# Patient Record
Sex: MALE | Race: BLACK OR AFRICAN AMERICAN | URBAN - METROPOLITAN AREA
[De-identification: names, ages, dates, MRNs, and addresses within clinical notes are randomized per-mention and may not be internally consistent; named-entity substitution may affect disease eponyms.]

---

## 2018-09-05 ENCOUNTER — TRANSFERRED RECORDS (OUTPATIENT)
Dept: HEALTH INFORMATION MANAGEMENT | Facility: CLINIC | Age: 58
End: 2018-09-05

## 2018-09-11 ENCOUNTER — PRE VISIT (OUTPATIENT)
Dept: OPHTHALMOLOGY | Facility: CLINIC | Age: 58
End: 2018-09-11

## 2018-09-11 PROBLEM — Z78.9 ENROLLED IN CHRONIC CARE MANAGEMENT: Status: ACTIVE | Noted: 2018-08-07

## 2018-09-11 PROBLEM — Z92.89 HISTORY OF PFTS: Status: ACTIVE | Noted: 2017-06-06

## 2018-09-11 PROBLEM — Z96.649 H/O TOTAL HIP ARTHROPLASTY: Status: ACTIVE | Noted: 2018-09-11

## 2018-09-11 PROBLEM — Z95.828 S/P IVC FILTER: Status: ACTIVE | Noted: 2018-09-11

## 2018-09-11 PROBLEM — E66.811 OBESITY (BMI 30.0-34.9): Status: ACTIVE | Noted: 2018-09-11

## 2018-09-11 PROBLEM — R74.8 ELEVATED LIVER ENZYMES: Status: ACTIVE | Noted: 2018-09-11

## 2018-09-11 RX ORDER — TAMSULOSIN HYDROCHLORIDE 0.4 MG/1
1 CAPSULE ORAL DAILY
COMMUNITY
Start: 2018-08-22

## 2018-09-11 NOTE — TELEPHONE ENCOUNTER
September 11, 2018               Pre-Visit Documentation: Tarik Chilel is a 57 year old male      Chief Complaint   Patient presents with     Previsit     appt w/ Dr Lewis     Tearing Left Eye     refered by Dr Jose         Current Outpatient Prescriptions   Medication Sig Dispense Refill     tamsulosin (FLOMAX) 0.4 MG capsule Take 1 capsule by mouth daily       AMBIEN 10 MG OR TABS 1 TABLET AT BEDTIME AS NEEDED       CELEBREX 200 MG OR CAPS 1 Capsule daily       COUMADIN 5 MG OR TABS 1 TABLET DAILY 30 12     IBUPROFEN 800 MG OR TABS 1 tab po TID (Three times per day) with food       NEURONTIN 300 MG OR CAPS 1 CAPSULE 3 TIMES DAILY       OTHER MEDICAL SUPPLIES add description of item-bilateral knee hi hoisery 1 1     OXYCODONE-ACETAMINOPHEN 5-325 MG OR TABS 1 TABLET EVERY 4 TO 6 HOURS AS NEEDED       OXYCODONE-ACETAMINOPHEN 5-325 MG/5ML OR SOLN 1 TEASPOONFUL EVERY 6 HOURS AS NEEDED       TEQUIN 400 MG OR TABS 1 TABLET DAILY         [facility]

## 2018-10-31 ENCOUNTER — OFFICE VISIT (OUTPATIENT)
Dept: OPHTHALMOLOGY | Facility: CLINIC | Age: 58
End: 2018-10-31
Payer: MEDICARE

## 2018-10-31 DIAGNOSIS — H04.222 EPIPHORA DUE TO INSUFFICIENT DRAINAGE OF LEFT SIDE: ICD-10-CM

## 2018-10-31 DIAGNOSIS — H02.135 SENILE ECTROPION OF LEFT LOWER EYELID: Primary | ICD-10-CM

## 2018-10-31 PROCEDURE — 86255 FLUORESCENT ANTIBODY SCREEN: CPT | Mod: 90 | Performed by: OPHTHALMOLOGY

## 2018-10-31 PROCEDURE — 92285 EXTERNAL OCULAR PHOTOGRAPHY: CPT | Performed by: OPHTHALMOLOGY

## 2018-10-31 PROCEDURE — 83519 RIA NONANTIBODY: CPT | Mod: 90 | Performed by: OPHTHALMOLOGY

## 2018-10-31 PROCEDURE — 83516 IMMUNOASSAY NONANTIBODY: CPT | Mod: 90 | Performed by: OPHTHALMOLOGY

## 2018-10-31 PROCEDURE — 99000 SPECIMEN HANDLING OFFICE-LAB: CPT | Performed by: OPHTHALMOLOGY

## 2018-10-31 PROCEDURE — 36415 COLL VENOUS BLD VENIPUNCTURE: CPT | Performed by: OPHTHALMOLOGY

## 2018-10-31 PROCEDURE — 99203 OFFICE O/P NEW LOW 30 MIN: CPT | Mod: 25 | Performed by: OPHTHALMOLOGY

## 2018-10-31 PROCEDURE — 68810 PROBE NASOLACRIMAL DUCT: CPT | Mod: LT | Performed by: OPHTHALMOLOGY

## 2018-10-31 ASSESSMENT — SLIT LAMP EXAM - LIDS: COMMENTS: NORMAL

## 2018-10-31 ASSESSMENT — VISUAL ACUITY
OS_CC: 20/40
METHOD: SNELLEN - LINEAR
OD_CC: 20/25

## 2018-10-31 NOTE — NURSING NOTE
Patient presents with:  Tearing Left Eye: refered by Dr Jose for tearing for a couple of months      Referring Provider:  Isra Jose MD  [facility] OPHTHALMOLOGY  [address] [address]        [facility]

## 2018-10-31 NOTE — MR AVS SNAPSHOT
After Visit Summary   10/31/2018    Tarik Chilel    MRN: [id number]           Patient Information     Date Of Birth          1960        Visit Information        Provider Department      10/31/2018 10:45 AM Roxanna Lewis MD [facility]        Today's Diagnoses     Senile ectropion of left lower eyelid    -  1    Epiphora due to insufficient drainage of left side           Follow-ups after your visit        Who to contact     If you have questions or need follow up information about today's clinic visit or your schedule please contact [facility] directly at [phone].  Normal or non-critical lab and imaging results will be communicated to you by MyChart, letter or phone within 4 business days after the clinic has received the results. If you do not hear from us within 7 days, please contact the clinic through MyChart or phone. If you have a critical or abnormal lab result, we will notify you by phone as soon as possible.  Submit refill requests through foc.us or call your pharmacy and they will forward the refill request to us. Please allow 3 business days for your refill to be completed.          Additional Information About Your Visit        Care EveryWhere ID     This is your Care EveryWhere ID. This could be used by other organizations to access your [facility] medical records  [id number]         Blood Pressure from Last 3 Encounters:   07/21/04 102/70   01/08/04 118/70   12/10/03 110/60    Weight from Last 3 Encounters:   07/21/04 94.8 kg (209 lb)   01/08/04 96.2 kg (212 lb)   12/10/03 83.5 kg (184 lb)              We Performed the Following     ACETYLCHOLINE MODULATING ANTIBODY     ACETYLCHOLINE RECEPTOR BINDING     ACETYLCHOLINE RECEPTOR BLOCKING LEROY     External Photos OU (both eyes)     Nasolacrimal Duct Probe/Irrg     Malinda-Operative Worksheet (Plastics)     STRIATED MUSCLE ANTIBODY IGG        Primary Care Provider Office Phone # Fax #     Angus Garduno [phone]       [facility] [address] [facility] [address]  [facility] [address]        Equal Access to Services     RUMA CAMACHO : Hadii aad ku hadyohanao Soomaali, waaxda luqadaha, qaybta kaalmada adeegyada, waxoriana melon andrewvaleria duncan laSherrycharles dyer. So [facility] [phone].    ATENCIÓN: Si habla español, tiene a estrada disposición servicios gratuitos de asistencia lingüística. Llame al [phone].    We comply with applicable federal civil rights laws and [place] laws. We do not discriminate on the basis of race, color, national origin, age, disability, sex, sexual orientation, or gender identity.            Thank you!     Thank you for choosing [facility]  for your care. Our goal is always to provide you with excellent care. Hearing back from our patients is one way we can continue to improve our services. Please take a few minutes to complete the written survey that you may receive in the mail after your visit with us. Thank you!             Your Updated Medication List - Protect others around you: Learn how to safely use, store and throw away your medicines at www.disposemymeds.org.          This list is accurate as of 10/31/18 11:29 AM.  Always use your most recent med list.                   Brand Name Dispense Instructions for use Diagnosis    AMBIEN 10 MG tablet   Generic drug:  zolpidem      1 TABLET AT BEDTIME AS NEEDED        celeBREX 200 MG capsule   Generic drug:  celecoxib      1 Capsule daily        COUMADIN 5 MG tablet   Generic drug:  warfarin     30    1 TABLET DAILY        ibuprofen 800 MG tablet    ADVIL/MOTRIN     1 tab po TID (Three times per day) with food        NEURONTIN 300 MG capsule   Generic drug:  gabapentin      1 CAPSULE 3 TIMES DAILY        other medical supplies     1    add description of item-bilateral knee hi hoisery        * oxyCODONE-acetaminophen 5-325 MG/5ML solution    ROXICET     1 TEASPOONFUL EVERY 6 HOURS AS  NEEDED        * oxyCODONE-acetaminophen 5-325 MG per tablet    PERCOCET     1 TABLET EVERY 4 TO 6 HOURS AS NEEDED        tamsulosin 0.4 MG capsule    FLOMAX     Take 1 capsule by mouth daily        TEQUIN 400 MG Tabs   Generic drug:  Gatifloxacin      1 TABLET DAILY        * Notice:  This list has 2 medication(s) that are the same as other medications prescribed for you. Read the directions carefully, and ask your doctor or other care provider to review them with you.

## 2018-10-31 NOTE — LETTER
10/31/2018         RE:  :  MRN: Tarik Chilel  1960  [id number]     Dear Dr. Jose,    Thank you for asking me to see your patient, Tarik Chilel, for an oculoplastic   consultation.  My assessment and plan are below.    Oculoplastic Clinic New Patient    Patient: Tarik Chilel MRN# [id number]   YOB: 1960 Age: 58 year old   Date of Visit: Oct 31, 2018    CC: Tearing       HPI:     Tarik Chilel is a 58 year old male who has noted tearing from the left eye. The right occasionally tears, but not nearly as bad. This seems to be associated with chewing, eating, driving, or being in the wind. No history of facial droop. There is no history of trauma to the face, significant sinusitis, or sinus tumors. The eye does not feel dry and irritated. The tears run down the cheeks, and obscure vision interfering with activities of daily living. He has tried artificial tears and pred acetate. He has some trouble swallowing.          Assessment and Plan:         Encounter Diagnoses   Name Primary?     Senile ectropion of left lower eyelid Yes     Epiphora due to insufficient drainage of left side      DIAGNOSIS: Epiphora left eye  PROCEDURE: Punctal dilation and lacrimal irrigation left eye  SURGEON: Roxanna Lewis MD  ANESTHESIA: Topical  COMPLICATIONS: None  EBL: Nil  FINDINGS: na% block right eye,  0% block left eye    The patient was placed supine in the examining chair. Tetracaine was placed in each eye.  The punctum was anesthesized with 2% lidocaine on a cotton tip applicator.  The punctum was dilated with punctal dilator. The 25 gauge lacrimal cannula was placed in the proximal canaliculus and the lacrimal system irrigated with water.  The patient tolerated the procedure well.     Roxanna Lewis  2018  11:23 AM  I was present for the entire procedure - Roxanna Lewis MD    There are some unusual features here - no history of facial nerve paralysis but reports worse with  chewing.   He has upgaze fatigue and decreased orbicularis function. No diplopia, but does endorse trouble swallowing. I wonder if some of these findings and symptoms could be more related to his sedating medications but deserves myasthenia lab panel. If normal can proceed with left lower eyelid ectropion repair, and excision of redundant conjunctiva which overlies the punctum.     At this point I don't think I will refer for further work up for MG if labs are normal given it is possible it is other medication side effects.   .           Again, thank you for allowing me to participate in the care of your patient.      Sincerely,    Roxanna Lewis MD  Department of Ophthalmology and Visual Neurosciences  [facility]    CC: Isra Jose MD  [facility]  [address] [address]  VIA Mail

## 2018-10-31 NOTE — PROGRESS NOTES
Oculoplastic Clinic New Patient    Patient: Tarik Chilel MRN# [id number]   YOB: 1960 Age: 58 year old   Date of Visit: Oct 31, 2018    CC: Tearing       HPI:     Tarik Chilel is a 58 year old male who has noted tearing from the left eye. The right occasionally tears, but not nearly as bad. This seems to be associated with chewing, eating, driving, or being in the wind. No history of facial droop. There is no history of trauma to the face, significant sinusitis, or sinus tumors. The eye does not feel dry and irritated. The tears run down the cheeks, and obscure vision interfering with activities of daily living. He has tried artificial tears and pred acetate. He has some trouble swallowing.          Assessment and Plan:         Encounter Diagnoses   Name Primary?     Senile ectropion of left lower eyelid Yes     Epiphora due to insufficient drainage of left side      DIAGNOSIS: Epiphora left eye  PROCEDURE: Punctal dilation and lacrimal irrigation left eye  SURGEON: Roxanna Lewis MD  ANESTHESIA: Topical  COMPLICATIONS: None  EBL: Nil  FINDINGS: na% block right eye,  0% block left eye    The patient was placed supine in the examining chair. Tetracaine was placed in each eye.  The punctum was anesthesized with 2% lidocaine on a cotton tip applicator.  The punctum was dilated with punctal dilator. The 25 gauge lacrimal cannula was placed in the proximal canaliculus and the lacrimal system irrigated with water.  The patient tolerated the procedure well.     Roxanna Lewis  October 31, 2018  11:23 AM  I was present for the entire procedure - Roxanna Lewis MD    There are some unusual features here - no history of facial nerve paralysis but reports worse with chewing.   He has upgaze fatigue and decreased orbicularis function. No diplopia, but does endorse trouble swallowing. I wonder if some of these findings and symptoms could be more related to his sedating medications but deserves  myasthenia lab panel. If normal can proceed with left lower eyelid ectropion repair, and excision of redundant conjunctiva which overlies the punctum.     Needs to get INR <2     At this point I don't think I will refer for further work up for MG if labs are normal given it is possible it is other medication side effects.     Attending Physician Attestation:  Complete documentation of historical and exam elements from today's encounter can be found in the full encounter summary report (not reduplicated in this progress note).  I personally obtained the chief complaint(s) and history of present illness.  I confirmed and edited as necessary the review of systems, past medical/surgical history, family history, social history, and examination findings as documented by others; and I examined the patient myself.  I personally reviewed the relevant tests, images, and reports as documented above.  I formulated and edited as necessary the assessment and plan and discussed the findings and management plan with the patient and family. - Roxanna Lewis MD

## 2018-11-02 LAB
ACHR BLOCK AB/ACHR TOTAL SFR SER: 22 % (ref 0–26)
STRIA MUS IGG SER QL IF: NORMAL

## 2018-11-04 LAB
ACHR BIND AB SER-SCNC: 0.1 NMOL/L (ref 0–0.4)
ACHR MOD AB/ACHR TOTAL SFR SER: 8 %

## 2018-11-06 ENCOUNTER — TELEPHONE (OUTPATIENT)
Dept: OTOLARYNGOLOGY | Facility: CLINIC | Age: 58
End: 2018-11-06

## 2018-11-06 NOTE — TELEPHONE ENCOUNTER
Miles Davey, can you please call patients family and let them know myasthenia labs were all normal and we can proceed with surgery as scheduled. This does not absolutely rule out myasthenia, but I will not work it up any further at this time. Thanks!

## 2018-11-06 NOTE — TELEPHONE ENCOUNTER
Phone call to pt and his wife, relayed within normal limits lab results and confirmed surgery time and date.  Pt's wife states they will schedule the preop physical.  Asked pt to call back if further questions or concerns arise. Tamica Son RN

## 2018-11-21 ENCOUNTER — ANESTHESIA EVENT (OUTPATIENT)
Dept: SURGERY | Facility: AMBULATORY SURGERY CENTER | Age: 58
End: 2018-11-21

## 2018-11-26 ENCOUNTER — ANESTHESIA (OUTPATIENT)
Dept: SURGERY | Facility: AMBULATORY SURGERY CENTER | Age: 58
End: 2018-11-26
Payer: COMMERCIAL

## 2018-11-26 ENCOUNTER — HOSPITAL ENCOUNTER (OUTPATIENT)
Facility: AMBULATORY SURGERY CENTER | Age: 58
Discharge: HOME | End: 2018-11-26
Attending: OPHTHALMOLOGY | Admitting: OPHTHALMOLOGY
Payer: MEDICARE

## 2018-11-26 ENCOUNTER — SURGERY (OUTPATIENT)
Age: 58
End: 2018-11-26
Payer: MEDICARE

## 2018-11-26 VITALS
TEMPERATURE: 97.1 F | BODY MASS INDEX: 35.81 KG/M2 | DIASTOLIC BLOOD PRESSURE: 77 MMHG | OXYGEN SATURATION: 98 % | HEIGHT: 75 IN | RESPIRATION RATE: 16 BRPM | WEIGHT: 288 LBS | SYSTOLIC BLOOD PRESSURE: 153 MMHG

## 2018-11-26 DIAGNOSIS — Z98.890 POSTOPERATIVE EYE STATE: Primary | ICD-10-CM

## 2018-11-26 LAB — INR BLD: 1.1 (ref 0.86–1.14)

## 2018-11-26 PROCEDURE — 68399 UNLISTED PX CONJUNCTIVA: CPT

## 2018-11-26 PROCEDURE — 68320 REVISE/GRAFT EYELID LINING: CPT | Mod: 59 | Performed by: OPHTHALMOLOGY

## 2018-11-26 PROCEDURE — 67917 REPAIR EYELID DEFECT: CPT | Mod: LT | Performed by: OPHTHALMOLOGY

## 2018-11-26 PROCEDURE — 36416 COLLJ CAPILLARY BLOOD SPEC: CPT | Performed by: ANESTHESIOLOGY

## 2018-11-26 PROCEDURE — G8907 PT DOC NO EVENTS ON DISCHARG: HCPCS

## 2018-11-26 PROCEDURE — 67917 REPAIR EYELID DEFECT: CPT | Mod: E2

## 2018-11-26 PROCEDURE — 85610 PROTHROMBIN TIME: CPT | Mod: QW | Performed by: ANESTHESIOLOGY

## 2018-11-26 PROCEDURE — G8918 PT W/O PREOP ORDER IV AB PRO: HCPCS

## 2018-11-26 RX ORDER — NEOMYCIN SULFATE, POLYMYXIN B SULFATE AND DEXAMETHASONE 3.5; 10000; 1 MG/ML; [USP'U]/ML; MG/ML
1 SUSPENSION/ DROPS OPHTHALMIC 3 TIMES DAILY
Qty: 1.5 ML | Refills: 0 | Status: SHIPPED | OUTPATIENT
Start: 2018-11-26 | End: 2018-12-06

## 2018-11-26 RX ORDER — ALBUTEROL SULFATE 0.83 MG/ML
2.5 SOLUTION RESPIRATORY (INHALATION) EVERY 4 HOURS PRN
Status: DISCONTINUED | OUTPATIENT
Start: 2018-11-26 | End: 2018-11-27 | Stop reason: HOSPADM

## 2018-11-26 RX ORDER — KETOROLAC TROMETHAMINE 30 MG/ML
30 INJECTION, SOLUTION INTRAMUSCULAR; INTRAVENOUS EVERY 6 HOURS PRN
Status: DISCONTINUED | OUTPATIENT
Start: 2018-11-26 | End: 2018-11-27 | Stop reason: HOSPADM

## 2018-11-26 RX ORDER — FENTANYL CITRATE 50 UG/ML
25-50 INJECTION, SOLUTION INTRAMUSCULAR; INTRAVENOUS
Status: DISCONTINUED | OUTPATIENT
Start: 2018-11-26 | End: 2018-11-27 | Stop reason: HOSPADM

## 2018-11-26 RX ORDER — ONDANSETRON 2 MG/ML
4 INJECTION INTRAMUSCULAR; INTRAVENOUS EVERY 30 MIN PRN
Status: DISCONTINUED | OUTPATIENT
Start: 2018-11-26 | End: 2018-11-27 | Stop reason: HOSPADM

## 2018-11-26 RX ORDER — ERYTHROMYCIN 5 MG/G
OINTMENT OPHTHALMIC
Qty: 3.5 G | Refills: 0 | Status: SHIPPED | OUTPATIENT
Start: 2018-11-26

## 2018-11-26 RX ORDER — ONDANSETRON 4 MG/1
4 TABLET, ORALLY DISINTEGRATING ORAL EVERY 30 MIN PRN
Status: DISCONTINUED | OUTPATIENT
Start: 2018-11-26 | End: 2018-11-27 | Stop reason: HOSPADM

## 2018-11-26 RX ORDER — LIDOCAINE 40 MG/G
CREAM TOPICAL
Status: DISCONTINUED | OUTPATIENT
Start: 2018-11-26 | End: 2018-11-27 | Stop reason: HOSPADM

## 2018-11-26 RX ORDER — MEPERIDINE HYDROCHLORIDE 25 MG/ML
12.5 INJECTION INTRAMUSCULAR; INTRAVENOUS; SUBCUTANEOUS
Status: DISCONTINUED | OUTPATIENT
Start: 2018-11-26 | End: 2018-11-27 | Stop reason: HOSPADM

## 2018-11-26 RX ORDER — HYDROCODONE BITARTRATE AND ACETAMINOPHEN 5; 325 MG/1; MG/1
1 TABLET ORAL
Status: DISCONTINUED | OUTPATIENT
Start: 2018-11-26 | End: 2018-11-27 | Stop reason: HOSPADM

## 2018-11-26 RX ORDER — HYDROMORPHONE HYDROCHLORIDE 1 MG/ML
.3-.5 INJECTION, SOLUTION INTRAMUSCULAR; INTRAVENOUS; SUBCUTANEOUS EVERY 10 MIN PRN
Status: DISCONTINUED | OUTPATIENT
Start: 2018-11-26 | End: 2018-11-27 | Stop reason: HOSPADM

## 2018-11-26 RX ORDER — TETRACAINE HYDROCHLORIDE 5 MG/ML
SOLUTION OPHTHALMIC PRN
Status: DISCONTINUED | OUTPATIENT
Start: 2018-11-26 | End: 2018-11-26 | Stop reason: HOSPADM

## 2018-11-26 RX ORDER — DEXAMETHASONE SODIUM PHOSPHATE 4 MG/ML
4 INJECTION, SOLUTION INTRA-ARTICULAR; INTRALESIONAL; INTRAMUSCULAR; INTRAVENOUS; SOFT TISSUE EVERY 10 MIN PRN
Status: DISCONTINUED | OUTPATIENT
Start: 2018-11-26 | End: 2018-11-27 | Stop reason: HOSPADM

## 2018-11-26 RX ORDER — FENTANYL CITRATE 50 UG/ML
INJECTION, SOLUTION INTRAMUSCULAR; INTRAVENOUS PRN
Status: DISCONTINUED | OUTPATIENT
Start: 2018-11-26 | End: 2018-11-26

## 2018-11-26 RX ORDER — SODIUM CHLORIDE, SODIUM LACTATE, POTASSIUM CHLORIDE, CALCIUM CHLORIDE 600; 310; 30; 20 MG/100ML; MG/100ML; MG/100ML; MG/100ML
INJECTION, SOLUTION INTRAVENOUS CONTINUOUS
Status: DISCONTINUED | OUTPATIENT
Start: 2018-11-26 | End: 2018-11-27 | Stop reason: HOSPADM

## 2018-11-26 RX ORDER — OXYCODONE HYDROCHLORIDE 5 MG/1
5-10 TABLET ORAL EVERY 4 HOURS PRN
Status: DISCONTINUED | OUTPATIENT
Start: 2018-11-26 | End: 2018-11-27 | Stop reason: HOSPADM

## 2018-11-26 RX ORDER — NALOXONE HYDROCHLORIDE 0.4 MG/ML
.1-.4 INJECTION, SOLUTION INTRAMUSCULAR; INTRAVENOUS; SUBCUTANEOUS
Status: DISCONTINUED | OUTPATIENT
Start: 2018-11-26 | End: 2018-11-27 | Stop reason: HOSPADM

## 2018-11-26 RX ORDER — ERYTHROMYCIN 5 MG/G
OINTMENT OPHTHALMIC PRN
Status: DISCONTINUED | OUTPATIENT
Start: 2018-11-26 | End: 2018-11-26 | Stop reason: HOSPADM

## 2018-11-26 RX ORDER — ACETAMINOPHEN 325 MG/1
975 TABLET ORAL ONCE
Status: COMPLETED | OUTPATIENT
Start: 2018-11-26 | End: 2018-11-26

## 2018-11-26 RX ADMIN — Medication 2.5 ML: at 14:28

## 2018-11-26 RX ADMIN — TETRACAINE HYDROCHLORIDE 1 DROP: 5 SOLUTION OPHTHALMIC at 14:23

## 2018-11-26 RX ADMIN — FENTANYL CITRATE 50 MCG: 50 INJECTION, SOLUTION INTRAMUSCULAR; INTRAVENOUS at 14:25

## 2018-11-26 RX ADMIN — SODIUM CHLORIDE, SODIUM LACTATE, POTASSIUM CHLORIDE, CALCIUM CHLORIDE: 600; 310; 30; 20 INJECTION, SOLUTION INTRAVENOUS at 14:20

## 2018-11-26 RX ADMIN — ERYTHROMYCIN 1 G: 5 OINTMENT OPHTHALMIC at 14:38

## 2018-11-26 RX ADMIN — ACETAMINOPHEN 975 MG: 325 TABLET ORAL at 13:50

## 2018-11-26 NOTE — ANESTHESIA CARE TRANSFER NOTE
Patient: Tarik Chilel    Procedure(s):  LEFT LOWER EYELID ECTROPION REPAIR, EXCISION OF REDUNDANT CONJUNCTIVA    Diagnosis: ECTROPION AND CONJUNCTIVOCHALASIS  Diagnosis Additional Information: No value filed.    Anesthesia Type:   No value filed.     Note:  Airway :Room Air  Patient transferred to:Phase II  Comments: To Phase II. Report to RN.  VSS Resp status stable.Handoff Report: Identifed the Patient, Identified the Reponsible Provider, Reviewed the pertinent medical history, Discussed the surgical course, Reviewed Intra-OP anesthesia mangement and issues during anesthesia, Set expectations for post-procedure period and Allowed opportunity for questions and acknowledgement of understanding      Vitals: (Last set prior to Anesthesia Care Transfer)    CRNA VITALS  11/26/2018 1420 - 11/26/2018 1454      11/26/2018             Pulse: 71    SpO2: 95 %                Electronically Signed By: OSCAR Gonsales CRNA  November 26, 2018  2:54 PM

## 2018-11-26 NOTE — ANESTHESIA PREPROCEDURE EVALUATION
Anesthesia Pre-Procedure Evaluation    Patient: Tarik Chilel   MRN:     [id number] Gender:   male   Age:    58 year old :      1960        Preoperative Diagnosis: ECTROPION AND CONJUNCTIVOCHALASIS   Procedure(s):  LEFT LOWER EYELID ECTROPION REPAIR, EXCISION OF REDUNDANT CONJUNCTIVA     No past medical history on file.   Past Surgical History:   Procedure Laterality Date     APPENDECTOMY       BACK SURGERY       HIP SURGERY            Anesthesia Evaluation     . Pt has had prior anesthetic. Type: General    No history of anesthetic complications          ROS/MED HX    ENT/Pulmonary:     (+)sleep apnea, CIERRA risk factors snores loudly, obese, uses CPAP , . .    Neurologic:  - neg neurologic ROS     Cardiovascular: Comment: History of pulmonary embolism        METS/Exercise Tolerance:     Hematologic: Comments: Vena Cava filter in place    (+) History of blood clots pt is anticoagulated, -      Musculoskeletal: Comment: Low back pain after back fracture        GI/Hepatic: Comment: Abnormal AST and ALT's    (+) liver disease,       Renal/Genitourinary:  - ROS Renal section negative       Endo:     (+) Obesity, .      Psychiatric:  - neg psychiatric ROS       Infectious Disease:  - neg infectious disease ROS       Malignancy:      - no malignancy   Other:    (+) H/O Chronic Pain,H/O chronic opiod use ,                        PHYSICAL EXAM:   Mental Status/Neuro:    Airway: Facies: Thick Neck; Challenging  Mallampati: I  Mouth/Opening: Full  TM distance: > 6 cm  Neck ROM: Full   Respiratory: Auscultation: CTAB     Resp. Rate: Normal     Resp. Effort: Normal      CV: Rhythm: Regular  Rate: Age appropriate  Heart: Normal Sounds   Comments:      Dental: Normal                  Lab Results   Component Value Date    WBC 11.6 (H) 2003    HGB 12.8 (L) 2003    HCT 37.3 (L) 2003     (H) 2003     2003    POTASSIUM 4.3 2003    CHLORIDE 108 2003    CO2 26 2003  "   BUN 11 12/24/2003    CR 1.10 12/24/2003    GLC 98 12/24/2003    LAITH 9.3 12/24/2003    INR 1.40 (H) 07/21/2004    TSH 2.76 04/24/2007       Preop Vitals  BP Readings from Last 3 Encounters:   07/21/04 102/70   01/08/04 118/70   12/10/03 110/60    Pulse Readings from Last 3 Encounters:   07/21/04 64   01/08/04 88   12/10/03 88      Resp Readings from Last 3 Encounters:   11/26/18 16    SpO2 Readings from Last 3 Encounters:   11/26/18 97%      Temp Readings from Last 1 Encounters:   11/26/18 97.1  F (36.2  C) (Temporal)    Ht Readings from Last 1 Encounters:   11/19/18 1.905 m (6' 3\")      Wt Readings from Last 1 Encounters:   11/19/18 130.6 kg (288 lb)    Estimated body mass index is 36 kg/(m^2) as calculated from the following:    Height as of this encounter: 1.905 m (6' 3\").    Weight as of this encounter: 130.6 kg (288 lb).     LDA:            Assessment:   ASA SCORE: 3    Will be NPO appropriate at: 11/26/2018 1:50 PM   Documentation: H&P complete; Preop Testing complete; Consents complete   Proceeding: Proceed without further delay  Tobacco Use:  NO Active use of Tobacco/UNKNOWN Tobacco use status     Plan:   Anes. Type:  MAC   Pre-Induction: Midazolam IV   Induction:  Not applicable   Airway: Native Airway   Access/Monitoring: PIV   Maintenance: Propofol; Dexmedetomidine   Emergence: N/a   Logistics: Same Day Surgery     Postop Pain/Sedation Strategy:  Standard-Options: Opioids PRN     PONV Management:  Adult Risk Factors:, Non-Smoker, Postop Opioids  Prevention: Propofol Infusion; Ondansetron; Dexamethasone     CONSENT: Direct conversation   Plan and risks discussed with: Patient; Spouse   Blood Products: Consent Deferred (Minimal Blood Loss)                         Andrea Sharma MD  "

## 2018-11-26 NOTE — IP AVS SNAPSHOT
MRN:[id number]                      After Visit Summary   11/26/2018    Tarik Chilel    MRN: [id number]           Thank you!     Thank you for choosing [facility] for your care. Our goal is always to provide you with excellent care. Hearing back from our patients is one way we can continue to improve our services. Please take a few minutes to complete the written survey that you may receive in the mail after you visit with us. Thank you!        Patient Information     Date Of Birth          1960        About your hospital stay     You were admitted on:  November 26, 2018 You last received care in the:  [facility] [place], [place]    You were discharged on:  November 26, 2018       Who to Call     For medical emergencies, please call 911.  For non-urgent questions about your medical care, please call your primary care provider or clinic, [phone]  For questions related to your surgery, please call your surgery clinic        Attending Provider     Provider Roxanna Grant MD Ophthalmology       Primary Care Provider Office Phone # Fax #    Ositadimma Mike Garduno [phone]      After Care Instructions     Discharge Medication Instructions       Do NOT take aspirin or medications containing NSAIDS for 72 hours after procedure.            Ice to affected area       Apply cold pack for 15 minutes on, 15 minutes off, for 48 hours while awake.                  Further instructions from your care team       Post-operative Instructions  Ophthalmic Plastic and Reconstructive Surgery    Roxanna Lewis M.D.     All instructions apply to the operated eye(s) or eyelid(s).    Wound care and personal care  ? If a patch or bandage has been placed, please leave this in place until seen by your physician. Ensure that the bandage does not get wet when you take a shower.  ? Apply ice compresses 15 minutes on 15 minutes off while awake for 2 days, then switch to warm  water compresses 4 times a day until seen by your physician. For warm packs you can place a cup of dry uncooked rice in a clean cotton sock. Then place sock in microwave 30 seconds to one minute. Next place the warm sock into a plastic bag and wrap the bag with clean warm wet washcloth and place over operated eye.    ? You may shower or wash your hair the day after surgery. Do not bathe or go swimming for 1 week to prevent contamination of your wounds.  ? Do not apply make-up to the eyes or eyelids for 2 weeks after surgery.  ? Expect some swelling, bruising, black eye (even into the lower eyelids and cheeks). Also expect serum caking, crusting and discharge from the eye and/or incisions. A small amount of surface bleeding is normal for the first 48 hours.  ? Your eye(s) and eyelid(s) may be painful and tender. This is normal after surgery.      Contact information and follow-up  ? Return to the Eye Clinic for a follow-up appointment with your physician as  scheduled. If no appointment has been scheduled:   - [facility] eye clinic: [phone] for an appointment with Dr. Lewis within 1 to 2 weeks from your date of surgery.   -  [facility] eye clinic: [phone] for an appointment with Dr. Lewis within 1 to 2 weeks from your date of surgery.     ? For severe pain, bleeding, or loss of vision, call the [facility] at [phone] or [facility] at [phone].     After hours or on weekends and holidays, call [phone] and ask to speak with the ophthalmologist on call.    An on call person can be reached after hours for concerns. The on call doctor should not call in medication refill requests after hours or on weekends, so please plan accordingly. An effort has been made to provide adequate pain medications following every surgery, and refills will not be provided in most instances. Narcotic pain medications cannot be called in.      Activity restrictions and driving  ? Avoid heavy lifting, bending, exercise or strenuous activity for 1 week after surgery.  You may resume other activities and return to work as tolerated.  ? You may not resume driving until have you stopped using narcotic pain medications (such as Norco, Percocet, Tylenol #3).    Medications  ? Restart all your regular home medications and eye drops. If you take Plavix or  Aspirin on a regular basis, wait for 72 hours after your surgery before restarting these in order to decrease the risk of bleeding complications.  ? Avoid aspirin and aspirin-like medications (Motrin, Aleve, Ibuprofen, Grazyna-  [place] etc) for 72 hours to reduce the risk of bleeding. You may take Tylenol  (acetaminophen) for pain.  ? In addition to your home medications, take the following post-operative medications as prescribed by your physician.    ? Apply antibiotic ointment to all sutures three times a day, and into the operated eye(s) at night.  ? Instill eye drops 3 times a day for 10 days.     [facility]  Same-Day Surgery   Adult Discharge Orders & Instructions   For 24 hours after surgery  1. Get plenty of rest.  A responsible adult must stay with you for at least 24 hours after you leave the hospital.   2. Do not drive or use heavy equipment.  If you have weakness or tingling, don't drive or use heavy equipment until this feeling goes away.  3. Do not drink alcohol.  4. Avoid strenuous or risky activities.  Ask for help when climbing stairs.   5. You may feel lightheaded.  IF so, sit for a few minutes before standing.  Have someone help you get up.   6. If you have nausea (feel sick to your stomach): Drink only clear liquids such as apple juice, ginger ale, broth or 7-Up.  Rest may also help.  Be sure to drink enough fluids.  Move to a regular diet as you feel able.  7. You may have a slight fever. Call the doctor if your fever is over 100 F (37.7 C) (taken under the tongue)  "or lasts longer than 24 hours.  8. You may have a dry mouth, a sore throat, muscle aches or trouble sleeping.  These should go away after 24 hours.  9. Do not make important or legal decisions.   Call your doctor for any of the followin.  Signs of infection (fever, growing tenderness at the surgery site, a large amount of drainage or bleeding, severe pain, foul-smelling drainage, redness, swelling).    2. It has been over 8 to 10 hours since surgery and you are still not able to urinate (pass water).    3.  Headache for over 24 hours.            Pending Results     No orders found from 2018 to 2018.            Admission Information     Date & Time Provider Department Dept. Phone    2018 Roxanna Lewis MD [facility] [phone]      Your Vitals Were     Blood Pressure Temperature Respirations Height Weight Pulse Oximetry    148/73 97.2  F (36.2  C) (Tympanic) 16 1.905 m (6' 3\") 130.6 kg (288 lb) 96%    BMI (Body Mass Index)                   36 kg/m2           Care EveryWhere ID     This is your Care EveryWhere ID. This could be used by other organizations to access your [place] medical records  [id number]        Equal Access to Services     RUMA CAMACHO AH: Hadii anrdeas lozanoo Sosimaali, waaxda luqadaha, qaybta kaalmada adeegyada, nadja dyer. So [facility] [phone].    ATENCIÓN: Si habla español, tiene a estrada disposición servicios gratuitos de asistencia lingüística. Llame al [phone].    We comply with applicable federal civil rights laws and [place] laws. We do not discriminate on the basis of race, color, national origin, age, disability, sex, sexual orientation, or gender identity.               Review of your medicines      START taking        Dose / Directions    erythromycin 5 MG/GM ophthalmic ointment   Commonly known as:  ROMYCIN   Used for:  Postoperative eye state        Apply small amount to incision sites three times daily " and 0.5 inch strip to left eye at bedtime   Quantity:  3.5 g   Refills:  0       neomycin-polymyxin-dexamethasone 3.5-15663-4.1 Susp ophthalmic susp   Commonly known as:  MAXITROL   Used for:  Postoperative eye state        Dose:  1 drop   Place 1 drop Into the left eye 3 times daily for 10 days   Quantity:  1.5 mL   Refills:  0         CONTINUE these medicines which have NOT CHANGED        Dose / Directions    AMBIEN 10 MG tablet   Generic drug:  zolpidem        1 TABLET AT BEDTIME AS NEEDED   Refills:  0       celeBREX 200 MG capsule   Generic drug:  celecoxib        1 Capsule daily   Refills:  0       COUMADIN 5 MG tablet   Generic drug:  warfarin        1 TABLET DAILY   Quantity:  30   Refills:  12       ibuprofen 800 MG tablet   Commonly known as:  ADVIL/MOTRIN        1 tab po TID (Three times per day) with food   Refills:  0       NEURONTIN 300 MG capsule   Generic drug:  gabapentin        1 CAPSULE 3 TIMES DAILY   Refills:  0       other medical supplies        add description of item-bilateral knee hi hoisery   Quantity:  1   Refills:  1       * oxyCODONE-acetaminophen 5-325 MG/5ML solution   Commonly known as:  ROXICET        1 TEASPOONFUL EVERY 6 HOURS AS NEEDED   Refills:  0       * oxyCODONE-acetaminophen 5-325 MG tablet   Commonly known as:  PERCOCET        1 TABLET EVERY 4 TO 6 HOURS AS NEEDED   Refills:  0       tamsulosin 0.4 MG capsule   Commonly known as:  FLOMAX        Dose:  1 capsule   Take 1 capsule by mouth daily   Refills:  0       TEQUIN 400 MG Tabs   Generic drug:  Gatifloxacin        1 TABLET DAILY   Refills:  0       * Notice:  This list has 2 medication(s) that are the same as other medications prescribed for you. Read the directions carefully, and ask your doctor or other care provider to review them with you.         Where to get your medicines      These medications were sent to [id number] - [place], [place] - [address] [place] [place] [address] [address]     Phone:   [phone]     erythromycin 5 MG/GM ophthalmic ointment    neomycin-polymyxin-dexamethasone 3.5-04849-4.1 Susp ophthalmic susp                Protect others around you: Learn how to safely use, store and throw away your medicines at www.disposemymeds.org.             Medication List: This is a list of all your medications and when to take them. Check marks below indicate your daily home schedule. Keep this list as a reference.      Medications           Morning Afternoon Evening Bedtime As Needed    AMBIEN 10 MG tablet   1 TABLET AT BEDTIME AS NEEDED   Generic drug:  zolpidem                                celeBREX 200 MG capsule   1 Capsule daily   Generic drug:  celecoxib                                COUMADIN 5 MG tablet   1 TABLET DAILY   Generic drug:  warfarin                                erythromycin 5 MG/GM ophthalmic ointment   Commonly known as:  ROMYCIN   Apply small amount to incision sites three times daily and 0.5 inch strip to left eye at bedtime   Last time this was given:  1 g on 11/26/2018  2:38 PM                                ibuprofen 800 MG tablet   Commonly known as:  ADVIL/MOTRIN   1 tab po TID (Three times per day) with food                                neomycin-polymyxin-dexamethasone 3.5-33964-7.1 Susp ophthalmic susp   Commonly known as:  MAXITROL   Place 1 drop Into the left eye 3 times daily for 10 days                                NEURONTIN 300 MG capsule   1 CAPSULE 3 TIMES DAILY   Generic drug:  gabapentin                                other medical supplies   add description of item-bilateral knee hi hoisery                                * oxyCODONE-acetaminophen 5-325 MG/5ML solution   Commonly known as:  ROXICET   1 TEASPOONFUL EVERY 6 HOURS AS NEEDED                                * oxyCODONE-acetaminophen 5-325 MG tablet   Commonly known as:  PERCOCET   1 TABLET EVERY 4 TO 6 HOURS AS NEEDED                                tamsulosin 0.4 MG capsule   Commonly known as:   FLOMAX   Take 1 capsule by mouth daily                                TEQUIN 400 MG Tabs   1 TABLET DAILY   Generic drug:  Gatifloxacin                                * Notice:  This list has 2 medication(s) that are the same as other medications prescribed for you. Read the directions carefully, and ask your doctor or other care provider to review them with you.

## 2018-11-26 NOTE — OP NOTE
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  ANESTHESIA: Monitored with local infiltration of a 50/50 mixture of 2% lidocaine with epinephrine and 0.5% Marcaine.   SURGEON: Roxanna Lewis MD.  ASSISTANT: Jay Medley MD  COMPLICATIONS: None.   ESTIMATED BLOOD LOSS: Less than 5 mL.   HISTORY: Tarik Chilel  presented with tearing  due to lower lid ectropion and excessive conjunctivochalasis. After the risks, benefits and alternatives to the proposed procedure were explained, informed consent was obtained.   DESCRIPTION OF PROCEDURE: Tarik Chilel was brought to the operating room and placed supine on the operating table. IV sedation was given. The lower lids and lateral canthal areas were infiltrated with local anesthetic. The area was prepped and draped in the typical fashion. Attention was directed to the left side. Lateral canthotomy and inferior cantholysis was performed with Laurie scissors. A lateral tarsal strip was fashioned with the high temperature cautery and the laurie scissors. The tarsal strip was secured to the lateral orbital rim periosteum with a double-armed 5-0 PDS suture in a horizontal mattress fashion. Lateral canthal angle was closed with a gray line to gray line suture of 5-0 Vicryl tied internally. Attention was directed nasally to the area of over-riding conjunctiva. The conjunctiva was incised and an ellipse was excised with Laurie scissors. Thermal cautery was applied to obtain hemostasis. 6-0 plain gut was used to close. The patient tolerated the procedure well. Antibiotic ointment was applied to the incisions. Tarik Chilel left the operating room in stable condition.   Roxanna Lewis MD

## 2018-11-26 NOTE — ANESTHESIA POSTPROCEDURE EVALUATION
Anesthesia POST Procedure Evaluation    Patient: Tarik Chilel   MRN:     [id number] Gender:   male   Age:    58 year old :      1960        Preoperative Diagnosis: ECTROPION AND CONJUNCTIVOCHALASIS   Procedure(s):  LEFT LOWER EYELID ECTROPION REPAIR, EXCISION OF REDUNDANT CONJUNCTIVA   Postop Comments: No value filed.       Anesthesia Type:  MAC    Reportable Event: NO     PAIN: Uncomplicated   Sign Out status: Comfortable, Well controlled pain     PONV: No PONV   Sign Out status:  No Nausea or Vomiting     Neuro/Psych: Uneventful perioperative course   Sign Out Status: Preoperative baseline; Age appropriate mentation     Airway/Resp.: Uneventful perioperative course   Sign Out Status: Non labored breathing, age appropriate RR; Resp. Status within EXPECTED Parameters     CV: Uneventful perioperative course   Sign Out status: Appropriate BP and perfusion indices; Appropriate HR/Rhythm     Disposition:   Sign Out in:  Phase II  Disposition:  Home  Recovery Course: Uneventful  Follow-Up: Not required           Last Anesthesia Record Vitals:  CRNA VITALS  2018 1420 - 2018 1515      2018             Pulse: 71    SpO2: 95 %          Last PACU/Preop Vitals:  Vitals:    18 1343 18 1456   BP: 129/65 148/73   Resp: 16 16   Temp: 97.1  F (36.2  C) 97.2  F (36.2  C)   SpO2: 97% 96%         Electronically Signed By: Andrea Sharma MD, 2018, 3:15 PM

## 2018-11-26 NOTE — BRIEF OP NOTE
[facility] Brief Operative Note    Pre-operative diagnosis: ECTROPION AND CONJUNCTIVOCHALASIS   Post-operative diagnosis Same   Procedure: Procedure(s):  LEFT LOWER EYELID ECTROPION REPAIR, EXCISION OF REDUNDANT CONJUNCTIVA   Surgeon: Roxanna Lewis M.D.   Assistants(s): Jay Medley M.D.   Estimated blood loss: Less than 10 mL   Specimens: None   Findings: As expected

## 2018-11-26 NOTE — DISCHARGE INSTRUCTIONS
Post-operative Instructions  Ophthalmic Plastic and Reconstructive Surgery    Roxanna Lewis M.D.     All instructions apply to the operated eye(s) or eyelid(s).    Wound care and personal care  ? If a patch or bandage has been placed, please leave this in place until seen by your physician. Ensure that the bandage does not get wet when you take a shower.  ? Apply ice compresses 15 minutes on 15 minutes off while awake for 2 days, then switch to warm water compresses 4 times a day until seen by your physician. For warm packs you can place a cup of dry uncooked rice in a clean cotton sock. Then place sock in microwave 30 seconds to one minute. Next place the warm sock into a plastic bag and wrap the bag with clean warm wet washcloth and place over operated eye.    ? You may shower or wash your hair the day after surgery. Do not bathe or go swimming for 1 week to prevent contamination of your wounds.  ? Do not apply make-up to the eyes or eyelids for 2 weeks after surgery.  ? Expect some swelling, bruising, black eye (even into the lower eyelids and cheeks). Also expect serum caking, crusting and discharge from the eye and/or incisions. A small amount of surface bleeding is normal for the first 48 hours.  ? Your eye(s) and eyelid(s) may be painful and tender. This is normal after surgery.      Contact information and follow-up  ? Return to the Eye Clinic for a follow-up appointment with your physician as  scheduled. If no appointment has been scheduled:   - [facility] eye clinic: [phone] for an appointment with Dr. Lewis within 1 to 2 weeks from your date of surgery.   -  [facility] eye clinic: [phone] for an appointment with Dr. Lewis within 1 to 2 weeks from your date of surgery.     ? For severe pain, bleeding, or loss of vision, call the [facility] at [phone] or [facility] at [phone].     After hours or on weekends  and holidays, call [phone] and ask to speak with the ophthalmologist on call.    An on call person can be reached after hours for concerns. The on call doctor should not call in medication refill requests after hours or on weekends, so please plan accordingly. An effort has been made to provide adequate pain medications following every surgery, and refills will not be provided in most instances. Narcotic pain medications cannot be called in.     Activity restrictions and driving  ? Avoid heavy lifting, bending, exercise or strenuous activity for 1 week after surgery.  You may resume other activities and return to work as tolerated.  ? You may not resume driving until have you stopped using narcotic pain medications (such as Norco, Percocet, Tylenol #3).    Medications  ? Restart all your regular home medications and eye drops. If you take Plavix or  Aspirin on a regular basis, wait for 72 hours after your surgery before restarting these in order to decrease the risk of bleeding complications.  ? Avoid aspirin and aspirin-like medications (Motrin, Aleve, Ibuprofen, Grazyna-  [place] etc) for 72 hours to reduce the risk of bleeding. You may take Tylenol  (acetaminophen) for pain.  ? In addition to your home medications, take the following post-operative medications as prescribed by your physician.    ? Apply antibiotic ointment to all sutures three times a day, and into the operated eye(s) at night.  ? Instill eye drops 3 times a day for 10 days.     [facility]  Same-Day Surgery   Adult Discharge Orders & Instructions   For 24 hours after surgery  1. Get plenty of rest.  A responsible adult must stay with you for at least 24 hours after you leave the hospital.   2. Do not drive or use heavy equipment.  If you have weakness or tingling, don't drive or use heavy equipment until this feeling goes away.  3. Do not drink alcohol.  4. Avoid strenuous or risky activities.  Ask for help when  climbing stairs.   5. You may feel lightheaded.  IF so, sit for a few minutes before standing.  Have someone help you get up.   6. If you have nausea (feel sick to your stomach): Drink only clear liquids such as apple juice, ginger ale, broth or 7-Up.  Rest may also help.  Be sure to drink enough fluids.  Move to a regular diet as you feel able.  7. You may have a slight fever. Call the doctor if your fever is over 100 F (37.7 C) (taken under the tongue) or lasts longer than 24 hours.  8. You may have a dry mouth, a sore throat, muscle aches or trouble sleeping.  These should go away after 24 hours.  9. Do not make important or legal decisions.   Call your doctor for any of the followin.  Signs of infection (fever, growing tenderness at the surgery site, a large amount of drainage or bleeding, severe pain, foul-smelling drainage, redness, swelling).    2. It has been over 8 to 10 hours since surgery and you are still not able to urinate (pass water).    3.  Headache for over 24 hours.

## 2018-11-26 NOTE — IP AVS SNAPSHOT
[facility] [place]    [address] [place] [address]    Phone:  [phone]                                       After Visit Summary   11/26/2018    Tarik Chilel    MRN: [id number]           After Visit Summary Signature Page     I have received my discharge instructions, and my questions have been answered. I have discussed any challenges I see with this plan with the nurse or doctor.    ..........................................................................................................................................  Patient/Patient Representative Signature      ..........................................................................................................................................  Patient Representative Print Name and Relationship to Patient    ..................................................               ................................................  Date                                   Time    ..........................................................................................................................................  Reviewed by Signature/Title    ...................................................              ..............................................  Date                                               Time          22EPIC Rev 08/18

## 2018-12-12 ENCOUNTER — OFFICE VISIT (OUTPATIENT)
Dept: OPHTHALMOLOGY | Facility: CLINIC | Age: 58
End: 2018-12-12
Payer: MEDICARE

## 2018-12-12 DIAGNOSIS — Z48.810 SURGICAL AFTERCARE, SENSE ORGANS: Primary | ICD-10-CM

## 2018-12-12 DIAGNOSIS — H02.135 SENILE ECTROPION OF LEFT LOWER EYELID: ICD-10-CM

## 2018-12-12 PROCEDURE — 99024 POSTOP FOLLOW-UP VISIT: CPT | Mod: GC | Performed by: OPHTHALMOLOGY

## 2018-12-12 ASSESSMENT — VISUAL ACUITY
OS_CC+: +2
OD_CC: 20/20
METHOD: SNELLEN - LINEAR
OS_CC: 20/25

## 2018-12-12 ASSESSMENT — SLIT LAMP EXAM - LIDS: COMMENTS: NORMAL

## 2018-12-12 NOTE — LETTER
2018         RE:  :  MRN: Tarik Chilel  1960  [id number]     Dear Dr. Jose,    Your patient, Tarik Chilel, returned for oculoplastic follow up. As you may recall, he was referred for tearing from the left eye. I felt this was due mostly to ectropion and redundant conjunctiva overlying the punctum. He underwent ectropion repair and excision of redundant conjunctiva on 18. He has healed well and reports no further issues with tearing.     He tells me he is planning on seeing you again for follow up of his glaucoma in the near future. Given he is doing well, I will sign off on his care at this time, but am happy to see him back with any issues.    Many thanks for allowing me to participate in the care of your patient.      Sincerely,    Roxanna Lewis MD  Department of Ophthalmology and Visual Neurosciences  [facility]      CC: Isra Jose MD  [facility]  [address] [address] [place] [address]  [facility] [address]  VIA Facsimile: [phone]

## 2018-12-12 NOTE — PROGRESS NOTES
Doing well postop left lower ectropion repair. Happy with outcome no tearing. Has some mild irritation lateral left, no loose stitch.    - ointment to incision three times daily  - continue warm packs  Return to clinic as needed. Has f/u with Dr. Jose.      Anuj Miramontes MD  [id number]   Attending Physician Attestation:  Complete documentation of historical and exam elements from today's encounter can be found in the full encounter summary report (not reduplicated in this progress note).  I personally obtained the chief complaint(s) and history of present illness.  I confirmed and edited as necessary the review of systems, past medical/surgical history, family history, social history, and examination findings as documented by others; and I examined the patient myself.  I personally reviewed the relevant tests, images, and reports as documented above.  I formulated and edited as necessary the assessment and plan and discussed the findings and management plan with the patient and family. - Roxanna Lewis MD

## (undated) DEVICE — PACK MINOR EYE

## (undated) DEVICE — GLOVE PROTEXIS W/NEU-THERA 7.5  2D73TE75

## (undated) DEVICE — ESU EYE HIGH TEMP 65410-183

## (undated) DEVICE — SOL WATER IRRIG 1000ML BOTTLE 07139-09

## (undated) DEVICE — SYR 03ML LL W/O NDL

## (undated) DEVICE — NDL 30GA 0.5" 305106

## (undated) DEVICE — ESU ELEC NDL 1" COATED/INSULATED E1465

## (undated) RX ORDER — FENTANYL CITRATE 50 UG/ML
INJECTION, SOLUTION INTRAMUSCULAR; INTRAVENOUS
Status: DISPENSED
Start: 2018-11-26

## (undated) RX ORDER — ACETAMINOPHEN 325 MG/1
TABLET ORAL
Status: DISPENSED
Start: 2018-11-26